# Patient Record
Sex: OTHER/UNKNOWN | URBAN - METROPOLITAN AREA
[De-identification: names, ages, dates, MRNs, and addresses within clinical notes are randomized per-mention and may not be internally consistent; named-entity substitution may affect disease eponyms.]

---

## 2020-02-21 ENCOUNTER — APPOINTMENT (OUTPATIENT)
Age: 45
Setting detail: DERMATOLOGY
End: 2020-02-27

## 2020-02-21 VITALS
HEART RATE: 73 BPM | SYSTOLIC BLOOD PRESSURE: 121 MMHG | HEIGHT: 64 IN | DIASTOLIC BLOOD PRESSURE: 74 MMHG | WEIGHT: 130 LBS

## 2020-02-21 DIAGNOSIS — L24 IRRITANT CONTACT DERMATITIS: ICD-10-CM

## 2020-02-21 PROBLEM — L24.9 IRRITANT CONTACT DERMATITIS, UNSPECIFIED CAUSE: Status: ACTIVE | Noted: 2020-02-21

## 2020-02-21 PROCEDURE — OTHER MIPS QUALITY: OTHER

## 2020-02-21 PROCEDURE — OTHER TREATMENT REGIMEN: OTHER

## 2020-02-21 PROCEDURE — OTHER PRESCRIPTION: OTHER

## 2020-02-21 PROCEDURE — 99202 OFFICE O/P NEW SF 15 MIN: CPT

## 2020-02-21 PROCEDURE — OTHER COUNSELING: OTHER

## 2020-02-21 RX ORDER — TRIAMCINOLONE ACETONIDE 1 MG/G
0.1% CREAM TOPICAL BID
Qty: 1 | Refills: 1 | Status: ERX | COMMUNITY
Start: 2020-02-21

## 2020-02-21 NOTE — PROCEDURE: TREATMENT REGIMEN
Otc Regimen: Recommended patient to use Dove aluminum free deodorant\\nRecommended patient to use fragrance free laundry detergent
Detail Level: Zone
Plan: On the next visit if area is not healed will plan to biopsy (punch biopsy)
Initiate Treatment: triamcinolone acetonide 0.1 % topical cream BID\\nDays Supply: 30\\nSig: Apply to affected areas  twice a day for 2 weeks then once a day for 1 week.  Do not apply to face.

## 2020-03-13 ENCOUNTER — APPOINTMENT (OUTPATIENT)
Age: 45
Setting detail: DERMATOLOGY
End: 2020-03-18

## 2020-03-13 VITALS
HEART RATE: 71 BPM | WEIGHT: 130 LBS | HEIGHT: 64 IN | SYSTOLIC BLOOD PRESSURE: 120 MMHG | DIASTOLIC BLOOD PRESSURE: 69 MMHG

## 2020-03-13 DIAGNOSIS — L24 IRRITANT CONTACT DERMATITIS: ICD-10-CM

## 2020-03-13 PROBLEM — E03.9 HYPOTHYROIDISM, UNSPECIFIED: Status: ACTIVE | Noted: 2020-03-13

## 2020-03-13 PROBLEM — L24.9 IRRITANT CONTACT DERMATITIS, UNSPECIFIED CAUSE: Status: ACTIVE | Noted: 2020-03-13

## 2020-03-13 PROCEDURE — 99213 OFFICE O/P EST LOW 20 MIN: CPT

## 2020-03-13 PROCEDURE — OTHER TREATMENT REGIMEN: OTHER

## 2020-03-13 PROCEDURE — OTHER MIPS QUALITY: OTHER

## 2020-03-13 PROCEDURE — OTHER COUNSELING: OTHER

## 2020-03-13 ASSESSMENT — SEVERITY ASSESSMENT 2020: SEVERITY 2020: ALMOST CLEAR

## 2020-03-13 ASSESSMENT — PAIN INTENSITY VAS: HOW INTENSE IS YOUR PAIN 0 BEING NO PAIN, 10 BEING THE MOST SEVERE PAIN POSSIBLE?: NO PAIN

## 2020-03-13 NOTE — PROCEDURE: TREATMENT REGIMEN
Otc Regimen: Recommended patient to use Dove aluminum free deodorant\\nRecommended patient to use fragrance free laundry detergent
Plan: Gave the patient the information for an allergist should the rash comes back.
Detail Level: Zone
Discontinue Regimen: triamcinolone acetonide 0.1 % topical cream Sig: Apply to affected areas  twice a day for 2 weeks then once a day for 1 week.  Do not apply to face.